# Patient Record
Sex: FEMALE | Race: WHITE
[De-identification: names, ages, dates, MRNs, and addresses within clinical notes are randomized per-mention and may not be internally consistent; named-entity substitution may affect disease eponyms.]

---

## 2017-01-27 ENCOUNTER — APPOINTMENT (OUTPATIENT)
Dept: ORTHOPEDIC SURGERY | Facility: CLINIC | Age: 57
End: 2017-01-27

## 2017-01-27 VITALS
WEIGHT: 155 LBS | HEART RATE: 87 BPM | SYSTOLIC BLOOD PRESSURE: 114 MMHG | HEIGHT: 60 IN | DIASTOLIC BLOOD PRESSURE: 78 MMHG | BODY MASS INDEX: 30.43 KG/M2

## 2017-01-27 DIAGNOSIS — Z78.9 OTHER SPECIFIED HEALTH STATUS: ICD-10-CM

## 2017-01-27 DIAGNOSIS — Z86.79 PERSONAL HISTORY OF OTHER DISEASES OF THE CIRCULATORY SYSTEM: ICD-10-CM

## 2017-01-27 DIAGNOSIS — Z86.39 PERSONAL HISTORY OF OTHER ENDOCRINE, NUTRITIONAL AND METABOLIC DISEASE: ICD-10-CM

## 2017-01-27 DIAGNOSIS — M23.304 OTHER MENISCUS DERANGEMENTS, UNSPECIFIED MEDIAL MENISCUS, LEFT KNEE: ICD-10-CM

## 2017-01-27 DIAGNOSIS — M23.303 OTHER MENISCUS DERANGEMENTS, UNSPECIFIED MEDIAL MENISCUS, RIGHT KNEE: ICD-10-CM

## 2017-01-27 DIAGNOSIS — Z80.0 FAMILY HISTORY OF MALIGNANT NEOPLASM OF DIGESTIVE ORGANS: ICD-10-CM

## 2017-01-27 RX ORDER — RAMIPRIL 5 MG/1
CAPSULE ORAL
Refills: 0 | Status: ACTIVE | COMMUNITY

## 2017-01-27 RX ORDER — METFORMIN HYDROCHLORIDE 625 MG/1
TABLET ORAL
Refills: 0 | Status: ACTIVE | COMMUNITY

## 2017-02-16 ENCOUNTER — APPOINTMENT (OUTPATIENT)
Dept: ORTHOPEDIC SURGERY | Facility: CLINIC | Age: 57
End: 2017-02-16

## 2017-02-16 DIAGNOSIS — M25.531 PAIN IN RIGHT WRIST: ICD-10-CM

## 2017-03-28 ENCOUNTER — OUTPATIENT (OUTPATIENT)
Dept: OUTPATIENT SERVICES | Facility: HOSPITAL | Age: 57
LOS: 1 days | End: 2017-03-28
Payer: MEDICAID

## 2017-03-28 VITALS
HEART RATE: 80 BPM | WEIGHT: 158.95 LBS | RESPIRATION RATE: 18 BRPM | DIASTOLIC BLOOD PRESSURE: 88 MMHG | OXYGEN SATURATION: 100 % | TEMPERATURE: 98 F | SYSTOLIC BLOOD PRESSURE: 128 MMHG | HEIGHT: 60 IN

## 2017-03-28 DIAGNOSIS — Z98.890 OTHER SPECIFIED POSTPROCEDURAL STATES: Chronic | ICD-10-CM

## 2017-03-28 DIAGNOSIS — Z01.818 ENCOUNTER FOR OTHER PREPROCEDURAL EXAMINATION: ICD-10-CM

## 2017-03-28 DIAGNOSIS — Z90.49 ACQUIRED ABSENCE OF OTHER SPECIFIED PARTS OF DIGESTIVE TRACT: Chronic | ICD-10-CM

## 2017-03-28 DIAGNOSIS — T84.84XA PAIN DUE TO INTERNAL ORTHOPEDIC PROSTHETIC DEVICES, IMPLANTS AND GRAFTS, INITIAL ENCOUNTER: ICD-10-CM

## 2017-03-28 DIAGNOSIS — Z86.79 PERSONAL HISTORY OF OTHER DISEASES OF THE CIRCULATORY SYSTEM: ICD-10-CM

## 2017-03-28 DIAGNOSIS — T85.848S PAIN DUE TO OTHER INTERNAL PROSTHETIC DEVICES, IMPLANTS AND GRAFTS, SEQUELA: ICD-10-CM

## 2017-03-28 DIAGNOSIS — E11.9 TYPE 2 DIABETES MELLITUS WITHOUT COMPLICATIONS: ICD-10-CM

## 2017-03-28 DIAGNOSIS — Z96.7 PRESENCE OF OTHER BONE AND TENDON IMPLANTS: Chronic | ICD-10-CM

## 2017-03-28 PROCEDURE — G0463: CPT

## 2017-03-28 RX ORDER — SODIUM CHLORIDE 9 MG/ML
3 INJECTION INTRAMUSCULAR; INTRAVENOUS; SUBCUTANEOUS EVERY 8 HOURS
Qty: 0 | Refills: 0 | Status: DISCONTINUED | OUTPATIENT
Start: 2017-04-07 | End: 2017-04-22

## 2017-03-28 RX ORDER — ACETAMINOPHEN 500 MG
975 TABLET ORAL ONCE
Qty: 0 | Refills: 0 | Status: COMPLETED | OUTPATIENT
Start: 2017-04-07 | End: 2017-04-07

## 2017-03-28 RX ORDER — CELECOXIB 200 MG/1
200 CAPSULE ORAL ONCE
Qty: 0 | Refills: 0 | Status: COMPLETED | OUTPATIENT
Start: 2017-04-07 | End: 2017-04-07

## 2017-03-28 RX ORDER — LIDOCAINE HCL 20 MG/ML
0.2 VIAL (ML) INJECTION ONCE
Qty: 0 | Refills: 0 | Status: DISCONTINUED | OUTPATIENT
Start: 2017-04-07 | End: 2017-04-22

## 2017-03-28 NOTE — H&P PST ADULT - NSANTHOSAYNRD_GEN_A_CORE
No. SHAYLA screening performed.  STOP BANG Legend: 0-2 = LOW Risk; 3-4 = INTERMEDIATE Risk; 5-8 = HIGH Risk

## 2017-03-28 NOTE — H&P PST ADULT - PSH
H/O arthroscopy of knee  bilateral knees  H/O lithotripsy    History of laparoscopic cholecystectomy    S/P ORIF (open reduction internal fixation) fracture  11/4/2010

## 2017-03-28 NOTE — H&P PST ADULT - REASON FOR ADMISSION
"I  have pain, had an accident 7 years ago and I have pins on my right wrist, now I am getting a lot of pain."

## 2017-03-28 NOTE — H&P PST ADULT - PMH
H/O: HTN (hypertension)    Pain from implanted hardware, sequela    Renal calculus  h/o renal calculus in the past  T2DM (type 2 diabetes mellitus)  last A1c 7 mg/dl   patient recently diagnosed

## 2017-04-07 ENCOUNTER — APPOINTMENT (OUTPATIENT)
Dept: ORTHOPEDIC SURGERY | Facility: HOSPITAL | Age: 57
End: 2017-04-07

## 2017-04-07 ENCOUNTER — OUTPATIENT (OUTPATIENT)
Dept: OUTPATIENT SERVICES | Facility: HOSPITAL | Age: 57
LOS: 1 days | End: 2017-04-07
Payer: MEDICAID

## 2017-04-07 ENCOUNTER — TRANSCRIPTION ENCOUNTER (OUTPATIENT)
Age: 57
End: 2017-04-07

## 2017-04-07 VITALS
DIASTOLIC BLOOD PRESSURE: 78 MMHG | RESPIRATION RATE: 20 BRPM | OXYGEN SATURATION: 97 % | TEMPERATURE: 98 F | HEART RATE: 74 BPM | SYSTOLIC BLOOD PRESSURE: 130 MMHG

## 2017-04-07 VITALS
HEIGHT: 60 IN | WEIGHT: 158.95 LBS | TEMPERATURE: 98 F | RESPIRATION RATE: 16 BRPM | SYSTOLIC BLOOD PRESSURE: 107 MMHG | DIASTOLIC BLOOD PRESSURE: 72 MMHG | HEART RATE: 83 BPM | OXYGEN SATURATION: 98 %

## 2017-04-07 DIAGNOSIS — Z90.49 ACQUIRED ABSENCE OF OTHER SPECIFIED PARTS OF DIGESTIVE TRACT: Chronic | ICD-10-CM

## 2017-04-07 DIAGNOSIS — T84.84XA PAIN DUE TO INTERNAL ORTHOPEDIC PROSTHETIC DEVICES, IMPLANTS AND GRAFTS, INITIAL ENCOUNTER: ICD-10-CM

## 2017-04-07 DIAGNOSIS — Z98.890 OTHER SPECIFIED POSTPROCEDURAL STATES: Chronic | ICD-10-CM

## 2017-04-07 DIAGNOSIS — Z96.7 PRESENCE OF OTHER BONE AND TENDON IMPLANTS: Chronic | ICD-10-CM

## 2017-04-07 PROCEDURE — 25115 REMOVE WRIST/FOREARM LESION: CPT | Mod: RT

## 2017-04-07 PROCEDURE — 20680 REMOVAL OF IMPLANT DEEP: CPT

## 2017-04-07 RX ORDER — SODIUM CHLORIDE 9 MG/ML
1000 INJECTION, SOLUTION INTRAVENOUS
Qty: 0 | Refills: 0 | Status: DISCONTINUED | OUTPATIENT
Start: 2017-04-07 | End: 2017-04-22

## 2017-04-07 RX ORDER — METFORMIN HYDROCHLORIDE 850 MG/1
1 TABLET ORAL
Qty: 0 | Refills: 0 | COMMUNITY

## 2017-04-07 RX ORDER — CELECOXIB 200 MG/1
200 CAPSULE ORAL ONCE
Qty: 0 | Refills: 0 | Status: DISCONTINUED | OUTPATIENT
Start: 2017-04-07 | End: 2017-04-22

## 2017-04-07 RX ORDER — RAMIPRIL 5 MG
0 CAPSULE ORAL
Qty: 0 | Refills: 0 | COMMUNITY

## 2017-04-07 RX ORDER — OXYCODONE HYDROCHLORIDE 5 MG/1
5 TABLET ORAL ONCE
Qty: 0 | Refills: 0 | Status: DISCONTINUED | OUTPATIENT
Start: 2017-04-07 | End: 2017-04-07

## 2017-04-07 RX ORDER — FAMOTIDINE 10 MG/ML
0 INJECTION INTRAVENOUS
Qty: 0 | Refills: 0 | COMMUNITY

## 2017-04-07 RX ORDER — ONDANSETRON 8 MG/1
4 TABLET, FILM COATED ORAL ONCE
Qty: 0 | Refills: 0 | Status: COMPLETED | OUTPATIENT
Start: 2017-04-07 | End: 2017-04-07

## 2017-04-07 RX ADMIN — Medication 975 MILLIGRAM(S): at 09:54

## 2017-04-07 RX ADMIN — CELECOXIB 200 MILLIGRAM(S): 200 CAPSULE ORAL at 09:54

## 2017-04-07 RX ADMIN — ONDANSETRON 4 MILLIGRAM(S): 8 TABLET, FILM COATED ORAL at 13:07

## 2017-04-07 RX ADMIN — OXYCODONE HYDROCHLORIDE 5 MILLIGRAM(S): 5 TABLET ORAL at 13:07

## 2017-04-07 NOTE — ASU DISCHARGE PLAN (ADULT/PEDIATRIC). - NOTIFY
Numbness, color, or temperature change to extremity/Fever greater than 101/Bleeding that does not stop/Swelling that continues/Pain not relieved by Medications

## 2017-04-20 ENCOUNTER — APPOINTMENT (OUTPATIENT)
Dept: ORTHOPEDIC SURGERY | Facility: CLINIC | Age: 57
End: 2017-04-20

## 2017-04-20 DIAGNOSIS — M65.839 OTHER SYNOVITIS AND TENOSYNOVITIS, UNSPECIFIED HAND: ICD-10-CM

## 2017-04-20 DIAGNOSIS — T84.84XA PAIN DUE TO INTERNAL ORTHOPEDIC PROSTHETIC DEVICES, IMPLANTS AND GRAFTS, INITIAL ENCOUNTER: ICD-10-CM

## 2017-04-20 DIAGNOSIS — M65.849 OTHER SYNOVITIS AND TENOSYNOVITIS, UNSPECIFIED HAND: ICD-10-CM

## 2017-06-29 ENCOUNTER — APPOINTMENT (OUTPATIENT)
Dept: ORTHOPEDIC SURGERY | Facility: CLINIC | Age: 57
End: 2017-06-29

## 2017-10-21 NOTE — H&P PST ADULT - PRIMARY CARE PROVIDER
Patient reports sob for three days and dark stools. Pt reports she has had generalized back pain for the last few days as well.    Dr. Shannan Leslie # [phone]

## 2017-11-29 ENCOUNTER — APPOINTMENT (OUTPATIENT)
Dept: INTERVENTIONAL RADIOLOGY/VASCULAR | Facility: CLINIC | Age: 57
End: 2017-11-29
Payer: MEDICAID

## 2017-11-29 VITALS
OXYGEN SATURATION: 97 % | HEIGHT: 60 IN | BODY MASS INDEX: 30.23 KG/M2 | HEART RATE: 84 BPM | DIASTOLIC BLOOD PRESSURE: 77 MMHG | SYSTOLIC BLOOD PRESSURE: 115 MMHG | WEIGHT: 154 LBS | RESPIRATION RATE: 18 BRPM

## 2017-11-29 DIAGNOSIS — N28.89 OTHER SPECIFIED DISORDERS OF KIDNEY AND URETER: ICD-10-CM

## 2017-11-29 DIAGNOSIS — Z86.69 PERSONAL HISTORY OF OTHER DISEASES OF THE NERVOUS SYSTEM AND SENSE ORGANS: ICD-10-CM

## 2017-11-29 PROCEDURE — 99205 OFFICE O/P NEW HI 60 MIN: CPT

## 2017-11-29 RX ORDER — LATANOPROST/PF 0.005 %
0.01 DROPS OPHTHALMIC (EYE)
Refills: 0 | Status: ACTIVE | COMMUNITY

## 2017-11-29 RX ORDER — HYDROCODONE BITARTRATE AND ACETAMINOPHEN 5; 325 MG/1; MG/1
5-325 TABLET ORAL
Qty: 20 | Refills: 0 | Status: COMPLETED | COMMUNITY
Start: 2017-04-06 | End: 2017-11-29

## 2017-11-29 RX ORDER — CHROMIUM 200 MCG
TABLET ORAL
Refills: 0 | Status: ACTIVE | COMMUNITY

## 2017-11-29 RX ORDER — NIFEDIPINE 90 MG
TABLET, EXTENDED RELEASE ORAL
Refills: 0 | Status: ACTIVE | COMMUNITY

## 2017-11-29 RX ORDER — DICLOFENAC SODIUM 75 MG/1
75 TABLET, DELAYED RELEASE ORAL
Qty: 60 | Refills: 1 | Status: COMPLETED | COMMUNITY
Start: 2017-01-27 | End: 2017-11-29

## 2018-03-05 ENCOUNTER — APPOINTMENT (OUTPATIENT)
Dept: ORTHOPEDIC SURGERY | Facility: CLINIC | Age: 58
End: 2018-03-05
Payer: MEDICAID

## 2018-03-05 VITALS — SYSTOLIC BLOOD PRESSURE: 125 MMHG | HEART RATE: 88 BPM | DIASTOLIC BLOOD PRESSURE: 84 MMHG

## 2018-03-05 DIAGNOSIS — M22.2X1 PATELLOFEMORAL DISORDERS, RIGHT KNEE: ICD-10-CM

## 2018-03-05 DIAGNOSIS — M22.2X2 PATELLOFEMORAL DISORDERS, RIGHT KNEE: ICD-10-CM

## 2018-03-05 PROCEDURE — 99214 OFFICE O/P EST MOD 30 MIN: CPT

## 2018-03-05 PROCEDURE — 73562 X-RAY EXAM OF KNEE 3: CPT | Mod: RT

## 2018-07-16 PROBLEM — N20.0 CALCULUS OF KIDNEY: Chronic | Status: ACTIVE | Noted: 2017-03-28

## 2018-07-16 PROBLEM — E11.9 TYPE 2 DIABETES MELLITUS WITHOUT COMPLICATIONS: Chronic | Status: ACTIVE | Noted: 2017-03-28

## 2019-11-05 NOTE — H&P PST ADULT - LAST CARDIAC ANGIOGRAM/IMAGING
Left Vm for pt's daughter regarding nebulizer machine  I called [employer] and they state they need to speak to pt to confirm some information before machine can be sent out    Have pt call [phone] [phone]
denies

## 2023-10-05 PROBLEM — T85.848S: Chronic | Status: ACTIVE | Noted: 2017-03-28

## 2023-10-05 PROBLEM — Z86.79 PERSONAL HISTORY OF OTHER DISEASES OF THE CIRCULATORY SYSTEM: Chronic | Status: ACTIVE | Noted: 2017-03-28

## 2023-10-11 ENCOUNTER — APPOINTMENT (OUTPATIENT)
Dept: ORTHOPEDIC SURGERY | Facility: CLINIC | Age: 63
End: 2023-10-11
Payer: MEDICAID

## 2023-10-11 DIAGNOSIS — M51.36 OTHER INTERVERTEBRAL DISC DEGENERATION, LUMBAR REGION: ICD-10-CM

## 2023-10-11 DIAGNOSIS — M54.50 LOW BACK PAIN, UNSPECIFIED: ICD-10-CM

## 2023-10-11 PROCEDURE — 72100 X-RAY EXAM L-S SPINE 2/3 VWS: CPT

## 2023-10-11 PROCEDURE — 99204 OFFICE O/P NEW MOD 45 MIN: CPT | Mod: 25

## 2023-10-11 RX ORDER — DICLOFENAC SODIUM 75 MG/1
75 TABLET, DELAYED RELEASE ORAL
Qty: 60 | Refills: 0 | Status: ACTIVE | COMMUNITY
Start: 2023-10-11 | End: 1900-01-01

## 2023-10-20 ENCOUNTER — NON-APPOINTMENT (OUTPATIENT)
Age: 63
End: 2023-10-20

## 2023-10-27 ENCOUNTER — APPOINTMENT (OUTPATIENT)
Dept: ORTHOPEDIC SURGERY | Facility: CLINIC | Age: 63
End: 2023-10-27
Payer: MEDICAID

## 2023-10-27 DIAGNOSIS — M54.16 RADICULOPATHY, LUMBAR REGION: ICD-10-CM

## 2023-10-27 PROCEDURE — 99214 OFFICE O/P EST MOD 30 MIN: CPT | Mod: 95

## 2024-04-29 NOTE — H&P PST ADULT - SKIN
\"[id number]\"    Leida Espinal MD as PCP - Empaneled Provider     Requested Pharmacy____________________________________________________________________  
No lesions; no rash

## 2025-01-13 ENCOUNTER — NON-APPOINTMENT (OUTPATIENT)
Age: 65
End: 2025-01-13

## 2025-01-13 ENCOUNTER — APPOINTMENT (OUTPATIENT)
Dept: ORTHOPEDIC SURGERY | Facility: CLINIC | Age: 65
End: 2025-01-13
Payer: MEDICAID

## 2025-01-13 VITALS — HEIGHT: 60 IN | BODY MASS INDEX: 27.68 KG/M2 | WEIGHT: 141 LBS

## 2025-01-13 DIAGNOSIS — M17.0 BILATERAL PRIMARY OSTEOARTHRITIS OF KNEE: ICD-10-CM

## 2025-01-13 DIAGNOSIS — M22.2X1 PATELLOFEMORAL DISORDERS, RIGHT KNEE: ICD-10-CM

## 2025-01-13 DIAGNOSIS — M22.2X2 PATELLOFEMORAL DISORDERS, RIGHT KNEE: ICD-10-CM

## 2025-01-13 PROCEDURE — 99203 OFFICE O/P NEW LOW 30 MIN: CPT | Mod: 25

## 2025-01-13 PROCEDURE — 73562 X-RAY EXAM OF KNEE 3: CPT | Mod: LT,RT

## 2025-01-16 NOTE — ASU PREOP CHECKLIST - BMI (KG/M2)
Copied from CRM #[id number]. Topic: MW [place] [employer]ICA[employer] / Plan: [employer] [place] CST Phone (Incoming) Shari [phone]            Alternative phone number: [phone]    Can a detailed message be left? Yes - Voicemail   Patient has been advised the message will be  addressed within 2-3 business days.       31

## 2025-02-07 ENCOUNTER — APPOINTMENT (OUTPATIENT)
Dept: ORTHOPEDIC SURGERY | Facility: CLINIC | Age: 65
End: 2025-02-07
Payer: MEDICAID

## 2025-02-07 DIAGNOSIS — M17.0 BILATERAL PRIMARY OSTEOARTHRITIS OF KNEE: ICD-10-CM

## 2025-02-07 PROCEDURE — 20610 DRAIN/INJ JOINT/BURSA W/O US: CPT | Mod: LT

## 2025-02-07 PROCEDURE — 99214 OFFICE O/P EST MOD 30 MIN: CPT | Mod: 25
